# Patient Record
Sex: FEMALE | Race: ASIAN | NOT HISPANIC OR LATINO | Employment: OTHER | URBAN - METROPOLITAN AREA
[De-identification: names, ages, dates, MRNs, and addresses within clinical notes are randomized per-mention and may not be internally consistent; named-entity substitution may affect disease eponyms.]

---

## 2018-07-20 ENCOUNTER — OFFICE VISIT (OUTPATIENT)
Dept: INTERNAL MEDICINE | Facility: MEDICAL CENTER | Age: 78
End: 2018-07-20

## 2018-07-20 ENCOUNTER — HOME HEALTH ADMISSION (OUTPATIENT)
Dept: HOME HEALTH SERVICES | Facility: HOME HEALTHCARE | Age: 78
End: 2018-07-20
Payer: MEDICARE

## 2018-07-20 VITALS
BODY MASS INDEX: 22.71 KG/M2 | DIASTOLIC BLOOD PRESSURE: 66 MMHG | SYSTOLIC BLOOD PRESSURE: 111 MMHG | WEIGHT: 108.2 LBS | TEMPERATURE: 98.3 F | HEART RATE: 65 BPM | HEIGHT: 58 IN | OXYGEN SATURATION: 95 %

## 2018-07-20 DIAGNOSIS — I61.8: ICD-10-CM

## 2018-07-20 DIAGNOSIS — I10 HYPERTENSION, BENIGN: ICD-10-CM

## 2018-07-20 DIAGNOSIS — Z00.00 HEALTH MAINTENANCE EXAMINATION: ICD-10-CM

## 2018-07-20 DIAGNOSIS — I61.9 STROKE, HEMORRHAGIC (HCC): ICD-10-CM

## 2018-07-20 DIAGNOSIS — Z23 ENCOUNTER FOR IMMUNIZATION: ICD-10-CM

## 2018-07-20 PROCEDURE — 90471 IMMUNIZATION ADMIN: CPT | Performed by: INTERNAL MEDICINE

## 2018-07-20 PROCEDURE — 99204 OFFICE O/P NEW MOD 45 MIN: CPT | Mod: GC,25 | Performed by: INTERNAL MEDICINE

## 2018-07-20 PROCEDURE — 90670 PCV13 VACCINE IM: CPT | Performed by: INTERNAL MEDICINE

## 2018-07-20 RX ORDER — ATORVASTATIN CALCIUM 20 MG/1
20 TABLET, FILM COATED ORAL NIGHTLY
COMMUNITY
Start: 2018-07-21

## 2018-07-20 RX ORDER — LOSARTAN POTASSIUM 100 MG/1
100 TABLET ORAL DAILY
COMMUNITY
Start: 2018-07-21

## 2018-07-20 RX ORDER — CLONIDINE HYDROCHLORIDE 0.1 MG/1
0.1 TABLET ORAL
COMMUNITY
Start: 2018-07-21

## 2018-07-20 RX ORDER — MEMANTINE HYDROCHLORIDE 5 MG/1
5 TABLET ORAL 2 TIMES DAILY
COMMUNITY
End: 2018-07-20

## 2018-07-20 RX ORDER — AMLODIPINE BESYLATE 10 MG/1
10 TABLET ORAL DAILY
COMMUNITY
Start: 2018-07-21

## 2018-07-20 RX ORDER — DONEPEZIL HYDROCHLORIDE 5 MG/1
5 TABLET, FILM COATED ORAL NIGHTLY
COMMUNITY
End: 2018-07-20

## 2018-07-20 ASSESSMENT — ENCOUNTER SYMPTOMS: GENERAL WELL-BEING: FAIR

## 2018-07-20 ASSESSMENT — PATIENT HEALTH QUESTIONNAIRE - PHQ9: CLINICAL INTERPRETATION OF PHQ2 SCORE: 0

## 2018-07-20 ASSESSMENT — ACTIVITIES OF DAILY LIVING (ADL): BATHING_REQUIRES_ASSISTANCE: 1

## 2018-07-20 NOTE — ASSESSMENT & PLAN NOTE
- stroke in 11/2017 in [place] due to aneurysmal bleed had a craniotomy and hematoma evacuation.   - she was discharged to a rehab facility and completed a rehab in [place] then she moved to [facility] and lived with her son before moving to [place] with her other son.   - HTN on amlodipine PRN clonidine for BP>160/90 in addition to the losartan.   - Since stroke she had problems with short term memory and her PCP in [facility] started her on memantine.  - MMSE 21 today.   - On Lipitor 20 mg nightly  Plan:  - Place referral for home health evaluation.  - Ordered TSH and B12 level.  -  Follow-up in 2 weeks  - Stopped stop Donepezil and memantine.

## 2018-07-20 NOTE — ASSESSMENT & PLAN NOTE
- Colon cancer colonoscopy screening: not indicated at this age, life expectancy and has been 10 years, no colonoscopy done before.   - Breast cancer mammography screening: not indicated at this age, not done before, life expectancy is 10 years.  - Cervical cancer PAP smear screening: not indicated at this age, not done before, life expectancy less than 10 years.   - Lung cancer screening: non smoker no screening indicated  - AAA screening: Not indicated.  - Osteoporosis screening: Not recommended.   - HIV screening: Was done before and was negative.  - flu vaccine:recommended to patient.   - Tdap: Recommended to patient.  - PNA vac: had PPSV 23 in 12/2014, gave prevnar today  - Basic Labs within the last 12 months: order CBC, CMP, lipid profile, TSH and vitamin B12 level.    - Vit D: order this visit   - Shingles vac: recommended shingrix   - Gave patient a copy of medical records release form to fill  It and send it to the clinic to get copy of medical records.

## 2018-07-20 NOTE — NON-PROVIDER
"Jie Carlos is a 77 y.o. female here for a non-provider visit for:   PREVNAR (PCV13) 1 of 1    Reason for immunization: Overdue/Provider Recommended  Immunization records indicate need for vaccine: Yes, confirmed with Epic  Minimum interval has been met for this vaccine: Yes  ABN completed: Not Indicated    Order and dose verified by: ZEYAD  VIS Dated  11/05/15 was given to patient: Yes  All IAC Questionnaire questions were answered \"No.\"    Patient tolerated injection and no adverse effects were observed or reported: Yes    Pt scheduled for next dose in series: Not Indicated    "

## 2018-07-20 NOTE — PATIENT INSTRUCTIONS
-  Please the flu shot at the pharmacy   - please get the Tdap vaccine at the pharmacy.   - please get the Shingrix vaccine at the pharmacy.    Dementia  Introduction  Dementia means losing some of your brain ability. People with dementia may have problems with:  · Memory.  · Making decisions.  · Behavior.  · Speaking.  · Thinking.  · Solving problems.  Follow these instructions at home:  Medicine  · Take over-the-counter and prescription medicines only as told by your doctor.  · Avoid taking medicines that can change how you think. These include pain or sleeping medicines.  Lifestyle  · Make healthy choices:  ¨ Be active as told by your doctor.  ¨ Do not use any tobacco products, such as cigarettes, chewing tobacco, and e-cigarettes. If you need help quitting, ask your doctor.  ¨ Eat a healthy diet.  ¨ When you get stressed, do something to help yourself relax. Your doctor can give you tips.  ¨ Spend time with other people.  · Drink enough fluid to keep your pee (urine) clear or pale yellow.  · Make sure you get good sleep. Use these tips to help you get a good night's rest:  ¨ Try not to take naps during the day.  ¨ Keep your sleeping area dark and cool.  ¨ In the few hours before you go to bed, try not to do any exercise.  ¨ Try not to have foods and drinks with caffeine in the evening.  General instructions  · Talk with your doctor to figure out:  ¨ What you need help with.  ¨ What your safety needs are.  · If you were given a bracelet that tracks your location, make sure to wear it.  · Keep all follow-up visits as told by your doctor. This is important.  Contact a doctor if:  · You have any new problems.  · You have problems with choking or swallowing.  · You have any symptoms of a different sickness.  Get help right away if:  · You have a fever.  · You feel mixed up (confused) or more mixed up than before.  · You have new sleepiness.  · You have sleepiness that gets worse.  · You have a hard time staying  awake.  · You or your family members are worried for your safety.  This information is not intended to replace advice given to you by your health care provider. Make sure you discuss any questions you have with your health care provider.  Document Released: 11/30/2009 Document Revised: 05/25/2017 Document Reviewed: 09/14/2016  © 2017 [employer]vier

## 2018-07-20 NOTE — LETTER
[facility]  Yenni Lundberg M.D.  [address] [place] [address]  [place] [address]  Fax: [phone]   Authorization for Release/Disclosure of   Protected Health Information   Name: JIE SOW : 1940 SSN: [id number]   Address: [address] [place], [place] [address]o [place] [address] Phone:    [phone] (home)    I authorize the entity listed below to release/disclose the PHI below to:   [facility]/Yenni Lundberg M.D. and Yenni Lundberg M.D.   Provider or Entity Name:     Address   City, State, Zip   Phone:      Fax:     Reason for request: continuity of care   Information to be released:    [  ] LAST COLONOSCOPY,  including any PATH REPORT and follow-up  [  ] LAST FIT/COLOGUARD RESULT [  ] LAST DEXA  [  ] LAST MAMMOGRAM  [  ] LAST PAP  [  ] LAST LABS [  ] RETINA EXAM REPORT  [  ] IMMUNIZATION RECORDS  [  ] Release all info      [  ] Check here and initial the line next to each item to release ALL health information INCLUDING  _____ Care and treatment for drug and / or alcohol abuse  _____ HIV testing, infection status, or AIDS  _____ Genetic Testing    DATES OF SERVICE OR TIME PERIOD TO BE DISCLOSED: _____________  I understand and acknowledge that:  * This Authorization may be revoked at any time by you in writing, except if your health information has already been used or disclosed.  * Your health information that will be used or disclosed as a result of you signing this authorization could be re-disclosed by the recipient. If this occurs, your re-disclosed health information may no longer be protected by State or Federal laws.  * You may refuse to sign this Authorization. Your refusal will not affect your ability to obtain treatment.  * This Authorization becomes effective upon signing and will  on (date) __________.      If no date is indicated, this Authorization will  one (1) year from the signature date.    Name: Jie Sow    Signature:   Date:     2018       PLEASE  FAX REQUESTED RECORDS BACK TO: [phone]

## 2018-07-20 NOTE — PROGRESS NOTES
New Patient to Establish    Reason to establish: New patient to establish    CC: Establishing care     HPI: Jie Carlos is a 77 y.o. Female with medical history of hemorraghic stroke status post craniotomy, hypertension and insomnia presented to the clinic to establish care as a new patient.     Stroke: she has had stroke in 11/2017 in [place] while visiting her son due to aneurysmal bleed had a craniotomy and hematoma evacuation, she was discharged to a rehab facility and completed a rehab in [place] then she moved to [facility] and lived with her son before moving to [place] with her other son, currently she lives at her son`s home, she needs help her son and his wife are helping her, she has HTN, amlodipine was added to her treatment with PRN clonidine for BP>160/90 in addition to the losartan. Since then she had problems with short term memory and her PCP in [facility] started her on memantine.     HTN: diagnosed years ago, she was on losartan before the stroke, she was not compliant with her medications, after the stroke, losartan was increased to 100mg, amlodipine added with PRN clonidine for blood pressure more than 160/90. Since then she is compliant with her medication, her son and his wife helping her with medications.    Insomnia: She had difficulty falling asleep, was on sleep medication, son is not aware of medication name but after the stroke she was switched to donepezil for sleep, currently patient is sleeping well, she is not taking donepezil, last dose was more than 1 month ago.    Of note patient was started on memantine in [place] after the stroke because she had memory issues, she is on memantine 5 mg twice a day, per patient and son there is no improvement.  MMSE score of 21 today.  Depression Screening    Little interest or pleasure in doing things?  0 - not at all  Feeling down, depressed , or hopeless? 0 - not at all  Patient Health Questionnaire Score: 0     If depressive symptoms identified deferred  to follow up visit unless specifically addressed in assessment and plan.    Interpretation of PHQ-9 Total Score   Score Severity   1-4 No Depression   5-9 Mild Depression   10-14 Moderate Depression   15-19 Moderately Severe Depression   20-27 Severe Depression       Fall Risk Assessment    Has the patient had two or more falls in the last year or any fall with injury in the last year?  Yes    Safety Assessment    Throw rugs on floor.  No  Handrails on all stairs.  Yes  Good lighting in all hallways.  Yes  Difficulty hearing.  Yes  Patient counseled about all safety risks that were identified.    Functional Assessment ADLs    Are there any barriers preventing you from cooking for yourself or meeting nutritional needs?  Yes.    Are there any barriers preventing you from driving safely or obtaining transportation?  Yes.    Are there any barriers preventing you from using a telephone or calling for help?  No.    Are there any barriers preventing you from shopping?  No.    Are there any barriers preventing you from taking care of your own finances?  Yes.    Are there any barriers preventing you from managing your medications?  Yes.    Are there any barriers preventing you from showering, bathing or dressing yourself?  Yes.    Are you currently engaging in any exercise or physical activity?  No.     What is your perception of your health?  Fair.      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     • memantine (NAMENDA) 5 MG Tab Take 5 mg by mouth 2 times a day.     • amLODIPine (NORVASC)  10 MG Tab Take 10 mg by mouth every day.     • losartan (COZAAR) 100 MG Tab Take 100 mg by mouth every day.     • cloNIDine (CATAPRES) 0.1 MG Tab Take 0.1 mg by mouth every evening.     • donepezil (ARICEPT) 5 MG Tab Take 5 mg by mouth every evening.       No current facility-administered medications for this visit.        Allergies as of 07/20/2018 - Reviewed 07/20/2018   Allergen Reaction Noted   • Hctz [hydrochlorothiazide]  12/03/2014   • Lisinopril  12/03/2014       Social History     Social History   • Marital status:      Spouse name: N/A   • Number of children: N/A   • Years of education: N/A     Occupational History   • Not on file.     Social History Main Topics   • Smoking status: Never Smoker   • Smokeless tobacco: Never Used   • Alcohol use No   • Drug use: No   • Sexual activity: No     Other Topics Concern   • Not on file     Social History Narrative   • No narrative on file       Family History   Problem Relation Age of Onset   • Hypertension Father    • Hypertension Sister        Past Surgical History:   Procedure Laterality Date   • CRANIOTOMY         ROS: As per HPI. Additional pertinent symptoms as noted below.  Review of Systems   Constitutional: Negative for fever, chills, weight loss, malaise/fatigue and diaphoresis.   HENT: Negative for ear discharge, ear pain, hearing loss and tinnitus.    Eyes: Negative for blurred vision, double vision, pain, discharge and redness.   Respiratory: Negative for cough, hemoptysis, sputum production, shortness of breath and wheezing.    Cardiovascular: Negative for chest pain, palpitations, orthopnea, leg swelling and PND.   Gastrointestinal: Negative for heartburn, nausea, vomiting, abdominal pain, diarrhea, constipation and blood in stool.   Genitourinary: Negative for dysuria, urgency, frequency, hematuria and flank pain.   Musculoskeletal: Negative for myalgias, back pain, joint pain and neck pain.   Skin: Negative for itching and rash.  "  Neurological: Negative for dizziness, tingling, tremors, focal weakness, loss of consciousness and headaches.   Psychiatric/Behavioral: Negative for depression and suicidal ideas. The patient is not nervous/anxious and does not have insomnia.        /66   Pulse 65   Temp 36.8 °C (98.3 °F)   Ht 1.461 m (4' 9.5\")   Wt 49.1 kg (108 lb 3.2 oz)   SpO2 95%   BMI 23.01 kg/m²     Physical Exam  Physical Exam   Constitutional: She is well-developed, well-nourished, and in no distress. No distress.   HENT:   Head: Normocephalic and atraumatic.   Mouth/Throat: No oropharyngeal exudate.   Eyes: Pupils are equal, round, and reactive to light. No scleral icterus.   Neck: Normal range of motion.   Cardiovascular: Normal rate.  Exam reveals no gallop and no friction rub.    No murmur heard.  Pulmonary/Chest: Effort normal. No respiratory distress. She exhibits no tenderness.   Abdominal: Soft. She exhibits no distension. There is no tenderness.   Musculoskeletal: She exhibits no edema, tenderness or deformity.   Lymphadenopathy:     She has no cervical adenopathy.   Neurological: She is alert. She is not disoriented. She displays no tremor. She shows no pronator drift.   Reflex Scores:       Bicep reflexes are 2+ on the right side and 3+ on the left side.       Patellar reflexes are 1+ on the right side and 4+ on the left side.  Left sided lower face weakness.  Left upper extremity power 3/5  Lower extremities or 5 / 5     Skin: No rash noted. She is not diaphoretic. No erythema. No pallor.   Psychiatric: Mood, affect and judgment normal.         Assessment and Plan    Health maintenance examination  - Colon cancer colonoscopy screening: not indicated at this age, life expectancy and has been 10 years, no colonoscopy done before.   - Breast cancer mammography screening: not indicated at this age, not done before, life expectancy is 10 years.  - Cervical cancer PAP smear screening: not indicated at this age, not done " before, life expectancy less than 10 years.   - Lung cancer screening: non smoker no screening indicated  - AAA screening: Not indicated.  - Osteoporosis screening: Not recommended.   - HIV screening: Was done before and was negative.  - flu vaccine:recommended to patient.   - Tdap: Recommended to patient.  - PNA vac: had PPSV 23 in 12/2014, gave prevnar today  - Basic Labs within the last 12 months: order CBC, CMP, lipid profile, TSH and vitamin B12 level.    - Vit D: order this visit   - Shingles vac: recommended shingrix   - Gave patient a copy of medical records release form to fill  It and send it to the clinic to get copy of medical records.          Stroke, hemorrhagic (HCC)  - stroke in 11/2017 in [place] due to aneurysmal bleed had a craniotomy and hematoma evacuation.   - she was discharged to a rehab facility and completed a rehab in [place] then she moved to [facility] and lived with her son before moving to [place] with her other son.   - HTN on amlodipine PRN clonidine for BP>160/90 in addition to the losartan.   - Since stroke she had problems with short term memory and her PCP in [facility] started her on memantine.  - MMSE 21 today.   - On Lipitor 20 mg nightly  Plan:  - Place referral for home health evaluation.  - Ordered TSH and B12 level.  -  Follow-up in 2 weeks  - Stopped stop Donepezil and memantine.         Hypertension, benign  - diagnosed years ago.   - was on losartan before the stroke. she was not compliant with her medications.   - losartan was increased to 100mg, amlodipine added with PRN clonidine for blood pressure more than 160/90.    - her son and his wife helping her with medications.  - Continue current medication.  Plan:  - will give a patient BP log to measure BP twice daily and record it.  - will plan to stop Clonidine.                Followup: No Follow-up on file.       Signed by: Yenni Lundberg M.D.

## 2018-07-20 NOTE — LETTER
[facility]  Yenni Lundberg M.D.  [address] [place] [address] [place], [place] [address] [place] [address]  Fax: [phone]   Authorization for Release/Disclosure of   Protected Health Information   Name: KAILEE SOW : 1940 SSN: [id number]   Address: [address] [place] [address] Phone:    [phone] (home)    I authorize the entity listed below to release/disclose the PHI below to:   [facility]/Yenni Lundberg M.D. and Yenni Lundberg M.D.   Provider or Entity Name: [facility]     Address   City, [facility]   Phone:      Fax:     Reason for request: continuity of care   Information to be released:    [  ] LAST COLONOSCOPY,  including any PATH REPORT and follow-up  [  ] LAST FIT/COLOGUARD RESULT [  ] LAST DEXA  [  ] LAST MAMMOGRAM  [  ] LAST PAP  [  ] LAST LABS [  ] RETINA EXAM REPORT  [  ] IMMUNIZATION RECORDS  [  ] Release all info      [  ] Check here and initial the line next to each item to release ALL health information INCLUDING  _____ Care and treatment for drug and / or alcohol abuse  _____ HIV testing, infection status, or AIDS  _____ Genetic Testing    DATES OF SERVICE OR TIME PERIOD TO BE DISCLOSED: _____________  I understand and acknowledge that:  * This Authorization may be revoked at any time by you in writing, except if your health information has already been used or disclosed.  * Your health information that will be used or disclosed as a result of you signing this authorization could be re-disclosed by the recipient. If this occurs, your re-disclosed health information may no longer be protected by State or Federal laws.  * You may refuse to sign this Authorization. Your refusal will not affect your ability to obtain treatment.  * This Authorization becomes effective upon signing and will  on (date) __________.      If no date is indicated, this Authorization will  one (1) year from the signature date.    Name: Kailee Sow    Signature:   Date:        7/20/2018       PLEASE FAX REQUESTED RECORDS BACK TO: [phone]

## 2018-07-20 NOTE — ASSESSMENT & PLAN NOTE
- diagnosed years ago.   - was on losartan before the stroke. she was not compliant with her medications.   - losartan was increased to 100mg, amlodipine added with PRN clonidine for blood pressure more than 160/90.    - her son and his wife helping her with medications.  - Continue current medication.  Plan:  - will give a patient BP log to measure BP twice daily and record it.  - will plan to stop Clonidine.

## 2018-07-21 ENCOUNTER — HOME CARE VISIT (OUTPATIENT)
Dept: HOME HEALTH SERVICES | Facility: HOME HEALTHCARE | Age: 78
End: 2018-07-21
Payer: MEDICARE

## 2018-07-21 VITALS
HEART RATE: 75 BPM | SYSTOLIC BLOOD PRESSURE: 118 MMHG | BODY MASS INDEX: 23.3 KG/M2 | WEIGHT: 108 LBS | TEMPERATURE: 98.4 F | RESPIRATION RATE: 16 BRPM | OXYGEN SATURATION: 98 % | HEIGHT: 57 IN | DIASTOLIC BLOOD PRESSURE: 62 MMHG

## 2018-07-21 PROCEDURE — 665999 HH PPS REVENUE DEBIT

## 2018-07-21 PROCEDURE — 665998 HH PPS REVENUE CREDIT

## 2018-07-21 PROCEDURE — G0493 RN CARE EA 15 MIN HH/HOSPICE: HCPCS

## 2018-07-21 PROCEDURE — 665001 SOC-HOME HEALTH

## 2018-07-21 SDOH — ECONOMIC STABILITY: HOUSING INSECURITY: UNSAFE APPLIANCES: 0

## 2018-07-21 SDOH — ECONOMIC STABILITY: HOUSING INSECURITY: UNSAFE COOKING RANGE AREA: 0

## 2018-07-21 ASSESSMENT — ACTIVITIES OF DAILY LIVING (ADL)
HOME_HEALTH_OASIS: 01
OASIS_M1830: 03

## 2018-07-21 ASSESSMENT — ENCOUNTER SYMPTOMS
NAUSEA: DENIES
VOMITING: DENIES

## 2018-07-21 ASSESSMENT — PATIENT HEALTH QUESTIONNAIRE - PHQ9
1. LITTLE INTEREST OR PLEASURE IN DOING THINGS: 01
2. FEELING DOWN, DEPRESSED, IRRITABLE, OR HOPELESS: 00

## 2018-07-22 PROCEDURE — 665999 HH PPS REVENUE DEBIT

## 2018-07-22 PROCEDURE — 665998 HH PPS REVENUE CREDIT

## 2018-07-23 ENCOUNTER — HOME CARE VISIT (OUTPATIENT)
Dept: HOME HEALTH SERVICES | Facility: HOME HEALTHCARE | Age: 78
End: 2018-07-23
Payer: MEDICARE

## 2018-07-23 ENCOUNTER — TELEPHONE (OUTPATIENT)
Dept: HEALTH INFORMATION MANAGEMENT | Facility: OTHER | Age: 78
End: 2018-07-23

## 2018-07-23 PROCEDURE — 665999 HH PPS REVENUE DEBIT

## 2018-07-23 PROCEDURE — 665998 HH PPS REVENUE CREDIT

## 2018-07-24 ENCOUNTER — HOME CARE VISIT (OUTPATIENT)
Dept: HOME HEALTH SERVICES | Facility: HOME HEALTHCARE | Age: 78
End: 2018-07-24
Payer: MEDICARE

## 2018-07-24 VITALS
TEMPERATURE: 97.3 F | DIASTOLIC BLOOD PRESSURE: 50 MMHG | OXYGEN SATURATION: 97 % | SYSTOLIC BLOOD PRESSURE: 122 MMHG | RESPIRATION RATE: 16 BRPM | HEART RATE: 72 BPM

## 2018-07-24 VITALS
SYSTOLIC BLOOD PRESSURE: 120 MMHG | RESPIRATION RATE: 18 BRPM | HEART RATE: 71 BPM | TEMPERATURE: 97.8 F | DIASTOLIC BLOOD PRESSURE: 60 MMHG | OXYGEN SATURATION: 96 %

## 2018-07-24 VITALS
OXYGEN SATURATION: 96 % | DIASTOLIC BLOOD PRESSURE: 60 MMHG | RESPIRATION RATE: 16 BRPM | HEART RATE: 70 BPM | TEMPERATURE: 98.2 F | SYSTOLIC BLOOD PRESSURE: 120 MMHG

## 2018-07-24 PROCEDURE — G0495 RN CARE TRAIN/EDU IN HH: HCPCS

## 2018-07-24 PROCEDURE — 665998 HH PPS REVENUE CREDIT

## 2018-07-24 PROCEDURE — G0152 HHCP-SERV OF OT,EA 15 MIN: HCPCS

## 2018-07-24 PROCEDURE — 665999 HH PPS REVENUE DEBIT

## 2018-07-24 PROCEDURE — G0151 HHCP-SERV OF PT,EA 15 MIN: HCPCS

## 2018-07-24 SDOH — ECONOMIC STABILITY: HOUSING INSECURITY: UNSAFE COOKING RANGE AREA: 0

## 2018-07-24 SDOH — ECONOMIC STABILITY: HOUSING INSECURITY: UNSAFE APPLIANCES: 0

## 2018-07-24 ASSESSMENT — ACTIVITIES OF DAILY LIVING (ADL)
EATING_ASSISTANCE: 0
GROOMING_ASSISTANCE: 0
IADLS_COMMENTS: <!--EPICS-->PLEASE REFER TO OT EVALUATION.<BR><!--EPICE-->
BATHING_ASSISTIVE_EQUIPMENT_USED: SHOWER CHAIR
ORAL_CARE_ASSISTANCE: 0
LAUNDRY_ASSISTANCE: 6
DRESSING_UB_ASSISTANCE: 0
SHOPPING_ASSISTANCE: 6
TOILETING_ASSISTANCE: 0
MEAL_PREP_ASSISTANCE: 6
TRANSPORTATION_ASSISTANCE: 6
DRESSING_LB_ASSISTANCE: 0
HOUSEKEEPING_ASSISTANCE: 6
BATHING_ASSISTANCE: 4
TELEPHONE_ASSISTANCE: 6
ADLS_COMMENTS: <!--EPICS-->PLEASE REFER TO OT EVALUATION.<!--EPICE-->

## 2018-07-24 ASSESSMENT — ENCOUNTER SYMPTOMS
POOR JUDGMENT: 1
DIFFICULTY THINKING: 1

## 2018-07-25 PROCEDURE — 665999 HH PPS REVENUE DEBIT

## 2018-07-25 PROCEDURE — 665998 HH PPS REVENUE CREDIT

## 2018-07-26 ENCOUNTER — HOME CARE VISIT (OUTPATIENT)
Dept: HOME HEALTH SERVICES | Facility: HOME HEALTHCARE | Age: 78
End: 2018-07-26
Payer: MEDICARE

## 2018-07-26 VITALS
HEART RATE: 74 BPM | OXYGEN SATURATION: 98 % | TEMPERATURE: 97.2 F | SYSTOLIC BLOOD PRESSURE: 120 MMHG | RESPIRATION RATE: 16 BRPM | DIASTOLIC BLOOD PRESSURE: 60 MMHG

## 2018-07-26 PROCEDURE — 665998 HH PPS REVENUE CREDIT

## 2018-07-26 PROCEDURE — G0495 RN CARE TRAIN/EDU IN HH: HCPCS

## 2018-07-26 PROCEDURE — 665999 HH PPS REVENUE DEBIT

## 2018-07-27 ENCOUNTER — HOME CARE VISIT (OUTPATIENT)
Dept: HOME HEALTH SERVICES | Facility: HOME HEALTHCARE | Age: 78
End: 2018-07-27
Payer: MEDICARE

## 2018-07-27 PROCEDURE — 665999 HH PPS REVENUE DEBIT

## 2018-07-27 PROCEDURE — G0153 HHCP-SVS OF S/L PATH,EA 15MN: HCPCS

## 2018-07-27 PROCEDURE — 665998 HH PPS REVENUE CREDIT

## 2018-07-27 ASSESSMENT — ENCOUNTER SYMPTOMS: DEPRESSED MOOD: 1

## 2018-07-28 ENCOUNTER — HOSPITAL ENCOUNTER (OUTPATIENT)
Dept: LAB | Facility: MEDICAL CENTER | Age: 78
End: 2018-07-28
Attending: STUDENT IN AN ORGANIZED HEALTH CARE EDUCATION/TRAINING PROGRAM

## 2018-07-28 VITALS
HEART RATE: 61 BPM | RESPIRATION RATE: 16 BRPM | DIASTOLIC BLOOD PRESSURE: 60 MMHG | SYSTOLIC BLOOD PRESSURE: 98 MMHG | TEMPERATURE: 97.6 F

## 2018-07-28 DIAGNOSIS — I61.8: ICD-10-CM

## 2018-07-28 DIAGNOSIS — Z00.00 HEALTH MAINTENANCE EXAMINATION: ICD-10-CM

## 2018-07-28 DIAGNOSIS — I10 HYPERTENSION, BENIGN: ICD-10-CM

## 2018-07-28 LAB
25(OH)D3 SERPL-MCNC: 27 NG/ML (ref 30–100)
ALBUMIN SERPL BCP-MCNC: 4.4 G/DL (ref 3.2–4.9)
ALBUMIN/GLOB SERPL: 1.3 G/DL
ALP SERPL-CCNC: 92 U/L (ref 30–99)
ALT SERPL-CCNC: 23 U/L (ref 2–50)
ANION GAP SERPL CALC-SCNC: 9 MMOL/L (ref 0–11.9)
AST SERPL-CCNC: 22 U/L (ref 12–45)
BASOPHILS # BLD AUTO: 1.4 % (ref 0–1.8)
BASOPHILS # BLD: 0.11 K/UL (ref 0–0.12)
BILIRUB SERPL-MCNC: 0.5 MG/DL (ref 0.1–1.5)
BUN SERPL-MCNC: 16 MG/DL (ref 8–22)
CALCIUM SERPL-MCNC: 9.3 MG/DL (ref 8.5–10.5)
CHLORIDE SERPL-SCNC: 101 MMOL/L (ref 96–112)
CHOLEST SERPL-MCNC: 145 MG/DL (ref 100–199)
CO2 SERPL-SCNC: 27 MMOL/L (ref 20–33)
CREAT SERPL-MCNC: 1 MG/DL (ref 0.5–1.4)
EOSINOPHIL # BLD AUTO: 0.2 K/UL (ref 0–0.51)
EOSINOPHIL NFR BLD: 2.5 % (ref 0–6.9)
ERYTHROCYTE [DISTWIDTH] IN BLOOD BY AUTOMATED COUNT: 42.6 FL (ref 35.9–50)
GLOBULIN SER CALC-MCNC: 3.5 G/DL (ref 1.9–3.5)
GLUCOSE SERPL-MCNC: 94 MG/DL (ref 65–99)
HCT VFR BLD AUTO: 36.3 % (ref 37–47)
HDLC SERPL-MCNC: 73 MG/DL
HGB BLD-MCNC: 12 G/DL (ref 12–16)
IMM GRANULOCYTES # BLD AUTO: 0.02 K/UL (ref 0–0.11)
IMM GRANULOCYTES NFR BLD AUTO: 0.3 % (ref 0–0.9)
LDLC SERPL CALC-MCNC: 53 MG/DL
LYMPHOCYTES # BLD AUTO: 3.14 K/UL (ref 1–4.8)
LYMPHOCYTES NFR BLD: 39.5 % (ref 22–41)
MCH RBC QN AUTO: 30.9 PG (ref 27–33)
MCHC RBC AUTO-ENTMCNC: 33.1 G/DL (ref 33.6–35)
MCV RBC AUTO: 93.6 FL (ref 81.4–97.8)
MONOCYTES # BLD AUTO: 0.37 K/UL (ref 0–0.85)
MONOCYTES NFR BLD AUTO: 4.7 % (ref 0–13.4)
NEUTROPHILS # BLD AUTO: 4.1 K/UL (ref 2–7.15)
NEUTROPHILS NFR BLD: 51.6 % (ref 44–72)
NRBC # BLD AUTO: 0 K/UL
NRBC BLD-RTO: 0 /100 WBC
PLATELET # BLD AUTO: 260 K/UL (ref 164–446)
PMV BLD AUTO: 10.6 FL (ref 9–12.9)
POTASSIUM SERPL-SCNC: 3.6 MMOL/L (ref 3.6–5.5)
PROT SERPL-MCNC: 7.9 G/DL (ref 6–8.2)
RBC # BLD AUTO: 3.88 M/UL (ref 4.2–5.4)
SODIUM SERPL-SCNC: 137 MMOL/L (ref 135–145)
TRIGL SERPL-MCNC: 93 MG/DL (ref 0–149)
TSH SERPL DL<=0.005 MIU/L-ACNC: 1.42 UIU/ML (ref 0.38–5.33)
VIT B12 SERPL-MCNC: 605 PG/ML (ref 211–911)
WBC # BLD AUTO: 7.9 K/UL (ref 4.8–10.8)

## 2018-07-28 PROCEDURE — 80061 LIPID PANEL: CPT

## 2018-07-28 PROCEDURE — 665999 HH PPS REVENUE DEBIT

## 2018-07-28 PROCEDURE — 82607 VITAMIN B-12: CPT

## 2018-07-28 PROCEDURE — 665998 HH PPS REVENUE CREDIT

## 2018-07-28 PROCEDURE — 82306 VITAMIN D 25 HYDROXY: CPT | Mod: GA

## 2018-07-28 PROCEDURE — 85025 COMPLETE CBC W/AUTO DIFF WBC: CPT

## 2018-07-28 PROCEDURE — 84443 ASSAY THYROID STIM HORMONE: CPT

## 2018-07-28 PROCEDURE — 36415 COLL VENOUS BLD VENIPUNCTURE: CPT

## 2018-07-28 PROCEDURE — 80053 COMPREHEN METABOLIC PANEL: CPT

## 2018-07-29 PROCEDURE — 665998 HH PPS REVENUE CREDIT

## 2018-07-29 PROCEDURE — 665999 HH PPS REVENUE DEBIT

## 2018-07-30 ENCOUNTER — HOME CARE VISIT (OUTPATIENT)
Dept: HOME HEALTH SERVICES | Facility: HOME HEALTHCARE | Age: 78
End: 2018-07-30
Payer: MEDICARE

## 2018-07-30 VITALS
RESPIRATION RATE: 16 BRPM | DIASTOLIC BLOOD PRESSURE: 50 MMHG | SYSTOLIC BLOOD PRESSURE: 110 MMHG | HEART RATE: 83 BPM | OXYGEN SATURATION: 98 % | TEMPERATURE: 98.3 F

## 2018-07-30 PROCEDURE — G0495 RN CARE TRAIN/EDU IN HH: HCPCS

## 2018-07-30 PROCEDURE — 665998 HH PPS REVENUE CREDIT

## 2018-07-30 PROCEDURE — 665999 HH PPS REVENUE DEBIT

## 2018-07-30 ASSESSMENT — ENCOUNTER SYMPTOMS: POOR JUDGMENT: 1

## 2018-07-31 ENCOUNTER — HOME CARE VISIT (OUTPATIENT)
Dept: HOME HEALTH SERVICES | Facility: HOME HEALTHCARE | Age: 78
End: 2018-07-31
Payer: MEDICARE

## 2018-07-31 VITALS
SYSTOLIC BLOOD PRESSURE: 100 MMHG | OXYGEN SATURATION: 97 % | TEMPERATURE: 97.6 F | DIASTOLIC BLOOD PRESSURE: 60 MMHG | RESPIRATION RATE: 16 BRPM | HEART RATE: 71 BPM

## 2018-07-31 PROCEDURE — 665999 HH PPS REVENUE DEBIT

## 2018-07-31 PROCEDURE — 665998 HH PPS REVENUE CREDIT

## 2018-07-31 PROCEDURE — G0152 HHCP-SERV OF OT,EA 15 MIN: HCPCS

## 2018-07-31 PROCEDURE — G0151 HHCP-SERV OF PT,EA 15 MIN: HCPCS

## 2018-07-31 PROCEDURE — G0155 HHCP-SVS OF CSW,EA 15 MIN: HCPCS

## 2018-07-31 ASSESSMENT — ENCOUNTER SYMPTOMS: POOR JUDGMENT: 1

## 2018-08-01 ENCOUNTER — HOME CARE VISIT (OUTPATIENT)
Dept: HOME HEALTH SERVICES | Facility: HOME HEALTHCARE | Age: 78
End: 2018-08-01
Payer: MEDICARE

## 2018-08-01 VITALS
OXYGEN SATURATION: 96 % | SYSTOLIC BLOOD PRESSURE: 110 MMHG | HEART RATE: 72 BPM | DIASTOLIC BLOOD PRESSURE: 60 MMHG | RESPIRATION RATE: 16 BRPM | TEMPERATURE: 97.6 F

## 2018-08-01 PROCEDURE — 665999 HH PPS REVENUE DEBIT

## 2018-08-01 PROCEDURE — 665998 HH PPS REVENUE CREDIT

## 2018-08-01 ASSESSMENT — ENCOUNTER SYMPTOMS: POOR JUDGMENT: 1

## 2018-08-02 ENCOUNTER — HOME CARE VISIT (OUTPATIENT)
Dept: HOME HEALTH SERVICES | Facility: HOME HEALTHCARE | Age: 78
End: 2018-08-02
Payer: MEDICARE

## 2018-08-02 VITALS
DIASTOLIC BLOOD PRESSURE: 51 MMHG | OXYGEN SATURATION: 94 % | TEMPERATURE: 98.1 F | SYSTOLIC BLOOD PRESSURE: 122 MMHG | HEART RATE: 66 BPM | RESPIRATION RATE: 16 BRPM

## 2018-08-02 PROCEDURE — 665999 HH PPS REVENUE DEBIT

## 2018-08-02 PROCEDURE — G0180 MD CERTIFICATION HHA PATIENT: HCPCS | Performed by: INTERNAL MEDICINE

## 2018-08-02 PROCEDURE — 665998 HH PPS REVENUE CREDIT

## 2018-08-02 PROCEDURE — G0152 HHCP-SERV OF OT,EA 15 MIN: HCPCS

## 2018-08-02 ASSESSMENT — ENCOUNTER SYMPTOMS: POOR JUDGMENT: 1

## 2018-08-03 PROCEDURE — 665998 HH PPS REVENUE CREDIT

## 2018-08-03 PROCEDURE — 665999 HH PPS REVENUE DEBIT

## 2018-08-04 ENCOUNTER — HOME CARE VISIT (OUTPATIENT)
Dept: HOME HEALTH SERVICES | Facility: HOME HEALTHCARE | Age: 78
End: 2018-08-04
Payer: MEDICARE

## 2018-08-04 PROCEDURE — 665999 HH PPS REVENUE DEBIT

## 2018-08-04 PROCEDURE — G0299 HHS/HOSPICE OF RN EA 15 MIN: HCPCS

## 2018-08-04 PROCEDURE — 665998 HH PPS REVENUE CREDIT

## 2018-08-05 VITALS
OXYGEN SATURATION: 96 % | TEMPERATURE: 96.8 F | SYSTOLIC BLOOD PRESSURE: 121 MMHG | HEART RATE: 62 BPM | DIASTOLIC BLOOD PRESSURE: 52 MMHG | RESPIRATION RATE: 18 BRPM

## 2018-08-05 PROCEDURE — 665998 HH PPS REVENUE CREDIT

## 2018-08-05 PROCEDURE — 665999 HH PPS REVENUE DEBIT

## 2018-08-06 ENCOUNTER — OFFICE VISIT (OUTPATIENT)
Dept: INTERNAL MEDICINE | Facility: MEDICAL CENTER | Age: 78
End: 2018-08-06

## 2018-08-06 VITALS
TEMPERATURE: 98.6 F | SYSTOLIC BLOOD PRESSURE: 125 MMHG | DIASTOLIC BLOOD PRESSURE: 66 MMHG | HEIGHT: 58 IN | BODY MASS INDEX: 22.67 KG/M2 | WEIGHT: 108 LBS | HEART RATE: 68 BPM | OXYGEN SATURATION: 96 %

## 2018-08-06 DIAGNOSIS — H91.90 DECREASED HEARING, UNSPECIFIED LATERALITY: ICD-10-CM

## 2018-08-06 DIAGNOSIS — I10 HYPERTENSION, BENIGN: ICD-10-CM

## 2018-08-06 DIAGNOSIS — R20.2 NUMBNESS AND TINGLING: ICD-10-CM

## 2018-08-06 DIAGNOSIS — R20.0 NUMBNESS AND TINGLING: ICD-10-CM

## 2018-08-06 DIAGNOSIS — I61.9 STROKE, HEMORRHAGIC (HCC): ICD-10-CM

## 2018-08-06 PROCEDURE — 99214 OFFICE O/P EST MOD 30 MIN: CPT | Mod: GC | Performed by: INTERNAL MEDICINE

## 2018-08-06 PROCEDURE — 665999 HH PPS REVENUE DEBIT

## 2018-08-06 PROCEDURE — 665998 HH PPS REVENUE CREDIT

## 2018-08-06 SDOH — ECONOMIC STABILITY: HOUSING INSECURITY: UNSAFE COOKING RANGE AREA: 0

## 2018-08-06 SDOH — ECONOMIC STABILITY: HOUSING INSECURITY: UNSAFE APPLIANCES: 0

## 2018-08-06 ASSESSMENT — PAIN SCALES - GENERAL: PAINLEVEL: NO PAIN

## 2018-08-06 NOTE — PROGRESS NOTES
Established Patient    Jie presents today with the following:    CC: Follow-up stroke, hypertension and new complain of feet numbness.    HPI: Jie Carlos is a 77 y.o. female with medical history of hemorrhagic stroke status post craniectomy and hypertension presented to the clinic for follow-up.    Stroke: she had stroke in 11/2017 in [place] due to aneurysmal bleed, had a craniotomy and hematoma evacuation, she completed a rehab, currently she is living at her son`s home, her son and his wife are helping her, Since then she had problems with short term memory and her PCP in [facility] started her on memantine.      HTN: diagnosed years ago, she was on losartan before the stroke, she was not compliant with her medications, after the stroke, losartan was increased to 100mg, amlodipine added with PRN clonidine for blood pressure more than 160/90. Since then she is compliant with her medication, her son and his wife helping her with medications, her average blood pressure readings at home 100s to 110s over 70s. She did not need to use the clonidine since she moved to [place].    Decreased hearing: Since the stroke her son noticed she is having decrease in her hearing, sometimes he needs to speak louder than usual, patient denies any symptoms.    Bilateral feet numbness: Patient complaining of feeling numbness and tingling in her feet, decreases when she starts walking, numbness started after the stroke and associated with feeling her head is heavy, vitamin B12 was 605 in July 2018.      Patient Active Problem List    Diagnosis Date Noted   • Numbness and tingling 08/06/2018   • Decreased hearing 08/06/2018   • Health maintenance examination 07/20/2018   • Stroke, hemorrhagic (HCC) 11/29/2017   • Hypertension, benign 12/02/2014   • Cough due to ACE inhibitor 12/02/2014   • Weakness 12/02/2014   • Hyponatremia 12/01/2014       Current Outpatient Prescriptions   Medication Sig Dispense Refill   • memantine (NAMENDA) 5 MG  Tab Take 5 mg by mouth 2 times a day.     • atorvastatin (LIPITOR) 20 MG Tab Take 20 mg by mouth every evening.     • amLODIPine (NORVASC) 10 MG Tab Take 10 mg by mouth every day.     • losartan (COZAAR) 100 MG Tab Take 100 mg by mouth every day.     • cloNIDine (CATAPRES) 0.1 MG Tab Take 0.1 mg by mouth 1 time daily as needed (high blood pressure). take if blood pressure is greater than 160/90     • melatonin 3 MG Tab Take 3 mg by mouth 1 time daily as needed (sleep).       No current facility-administered medications for this visit.        Social History     Social History   • Marital status:      Spouse name: N/A   • Number of children: N/A   • Years of education: N/A     Occupational History   • Not on file.     Social History Main Topics   • Smoking status: Never Smoker   • Smokeless tobacco: Never Used   • Alcohol use No   • Drug use: No   • Sexual activity: No     Other Topics Concern   • Not on file     Social History Narrative   • No narrative on file       Family History   Problem Relation Age of Onset   • Hypertension Father    • Hypertension Sister        ROS: As per HPI. Additional pertinent symptoms as noted below.  Review of Systems   Constitutional: Negative for fever, chills, weight loss, malaise/fatigue and diaphoresis.   HENT: Negative for ear discharge, ear pain, hearing loss and tinnitus.    Eyes: Negative for blurred vision, double vision, pain, discharge and redness.   Respiratory: Negative for cough, hemoptysis, sputum production, shortness of breath and wheezing.    Cardiovascular: Negative for chest pain, palpitations, orthopnea, leg swelling and PND.   Gastrointestinal: Negative for heartburn, nausea, vomiting, abdominal pain, diarrhea, constipation and blood in stool.   Genitourinary: Negative for dysuria, urgency, frequency, hematuria and flank pain.   Musculoskeletal: Negative for myalgias, back pain, joint pain and neck pain.   Skin: Negative for itching and rash.  "  Neurological: Negative for dizziness, tingling, tremors, focal weakness, loss of consciousness and headaches.   Psychiatric/Behavioral: Negative for depression and suicidal ideas. The patient is not nervous/anxious and does not have insomnia        /66   Pulse 68   Temp 37 °C (98.6 °F)   Ht 1.461 m (4' 9.5\")   Wt 49 kg (108 lb)   SpO2 96%   Breastfeeding? No   BMI 22.97 kg/m²     Physical Exam  Constitutional: She is well-developed, well-nourished, and in no distress. No distress.   HENT:   Ears: within normal, Abbasi test normal.   Head: Normocephalic and atraumatic.   Mouth/Throat: No oropharyngeal exudate.   Eyes: Pupils are equal, round, and reactive to light. No scleral icterus.   Neck: Normal range of motion.   Cardiovascular: Normal rate.  Exam reveals no gallop and no friction rub.    No murmur heard.  Pulmonary/Chest: Effort normal. No respiratory distress. She exhibits no tenderness.   Abdominal: Soft. She exhibits no distension. There is no tenderness.   Musculoskeletal: She exhibits no edema, tenderness or deformity.   Lymphadenopathy:     She has no cervical adenopathy.   Neurological: She is alert. She is not disoriented. She displays no tremor. She shows no pronator drift.   Reflex Scores:       Bicep reflexes are 2+ on the right side and 3+ on the left side.       Patellar reflexes are 1+ on the right side and 4+ on the left side.  Left sided lower face weakness.  Left upper extremity power 3/5  Lower extremities or 5 / 5     Skin: No rash noted. She is not diaphoretic. No erythema. No pallor.   Psychiatric: Mood, affect and judgment normal.         Assessment and Plan    Hypertension, benign  - diagnosed in 2017.    - was on losartan before the stroke. she was not compliant with her medications.   - Currently on losartan 100mg daily, amlodipine 10 mg daily with PRN clonidine for blood pressure more than 160/90.    - her son and his wife helping her with medications.  - Average of blood " pressure at home 110s /70s.  Plan:  - Continue current medication.     Stroke, hemorrhagic (HCC)  - stroke in 11/2017 in [place] due to aneurysmal bleed had a craniotomy and hematoma evacuation.   - she completed a rehab in [place].    - HTN on amlodipine and losartan with PRN clonidine for BP>160/90 in addition to the losartan.   - On Lipitor 20 mg nightly.  - Since stroke she had problems with short term memory and her PCP in [facility] started her on memantine.  - MMSE 21 in July 2018.   - Currently following up with home health services.  - TSH and B12 within normal in July 2018.  Plan:  - Continue hypertension and dyslipidemia medications  - Continue following up with home health.  - Had a discussion with the patient and her son for stopping memantine as there is no evidence that it helps for vascular dementia, patient and son still want to use it.      Decreased hearing  - noticed  eugene the stroke.   - son noticed she is having decrease in her hearing, sometimes he needs to speak louder than usual.   - denies any symptoms.  - On examination within normal, Abbasi test show no lateralization.   - Referral to ENT for evaluation of possible need for hearing aids.    Numbness and tingling  - complaining of feeling numbness and tingling in her feet.   - decreases when walking.   - numbness started after the stroke and associated with feeling her head is heavy.   - vitamin B12 was 605 and fasting blood glucose is within normal in July 2018.  - No history of alcoholism.  - Most likely related to stroke or aging, reversible causes were ruled out.  - We'll hold off trying treatment with gabapentin because of her risk of falls, patient alone by herself in the morning unsupervised, the severity of numbness is mild and not affecting her lifestyle. Discussed with the patient and son.        Signed by: Yenni Lundberg M.D.

## 2018-08-06 NOTE — ASSESSMENT & PLAN NOTE
- noticed  eugene the stroke.   - son noticed she is having decrease in her hearing, sometimes he needs to speak louder than usual.   - denies any symptoms.  - On examination within normal, Abbasi test show no lateralization.   - Referral to ENT for evaluation of possible need for hearing aids.

## 2018-08-06 NOTE — PATIENT INSTRUCTIONS
- please follow up referral     Hearing Loss  Introduction  Hearing loss is a partial or total loss of the ability to hear. This can be temporary or permanent, and it can happen in one or both ears. Hearing loss may be referred to as deafness.  Medical care is necessary to treat hearing loss properly and to prevent the condition from getting worse. Your hearing may partially or completely come back, depending on what caused your hearing loss and how severe it is. In some cases, hearing loss is permanent.  What are the causes?  Common causes of hearing loss include:  · Too much wax in the ear canal.  · Infection of the ear canal or middle ear.  · Fluid in the middle ear.  · Injury to the ear or surrounding area.  · An object stuck in the ear.  · Prolonged exposure to loud sounds, such as music.  Less common causes of hearing loss include:  · Tumors in the ear.  · Viral or bacterial infections, such as meningitis.  · A hole in the eardrum (perforated eardrum).  · Problems with the hearing nerve that sends signals between the brain and the ear.  · Certain medicines.  What are the signs or symptoms?  Symptoms of this condition may include:  · Difficulty telling the difference between sounds.  · Difficulty following a conversation when there is background noise.  · Lack of response to sounds in your environment. This may be most noticeable when you do not respond to startling sounds.  · Needing to turn up the volume on the television, radio, etc.  · Ringing in the ears.  · Dizziness.  · Pain in the ears.  How is this diagnosed?  This condition is diagnosed based on a physical exam and a hearing test (audiometry). The audiometry test will be performed by a hearing specialist (audiologist). You may also be referred to an ear, nose, and throat (ENT) specialist (otolaryngologist).  How is this treated?  Treatment for recent onset of hearing loss may include:  · Ear wax removal.  · Being prescribed medicines to prevent  infection (antibiotics).  · Being prescribed medicines to reduce inflammation (corticosteroids).  Follow these instructions at home:  · If you were prescribed an antibiotic medicine, take it as told by your health care provider. Do not stop taking the antibiotic even if you start to feel better.  · Take over-the-counter and prescription medicines only as told by your health care provider.  · Avoid loud noises.  · Return to your normal activities as told by your health care provider. Ask your health care provider what activities are safe for you.  · Keep all follow-up visits as told by your health care provider. This is important.  Contact a health care provider if:  · You feel dizzy.  · You develop new symptoms.  · You vomit or feel nauseous.  · You have a fever.  Get help right away if:  · You develop sudden changes in your vision.  · You have severe ear pain.  · You have new or increased weakness.  · You have a severe headache.  This information is not intended to replace advice given to you by your health care provider. Make sure you discuss any questions you have with your health care provider.  Document Released: 12/18/2006 Document Revised: 05/25/2017 Document Reviewed: 05/04/2016  © 2017 [employer]

## 2018-08-06 NOTE — ASSESSMENT & PLAN NOTE
- stroke in 11/2017 in [place] due to aneurysmal bleed had a craniotomy and hematoma evacuation.   - she completed a rehab in [place].    - HTN on amlodipine and losartan with PRN clonidine for BP>160/90 in addition to the losartan.   - On Lipitor 20 mg nightly.  - Since stroke she had problems with short term memory and her PCP in [facility] started her on memantine.  - MMSE 21 in July 2018.   - Currently following up with home health services.  - TSH and B12 within normal in July 2018.  Plan:  - Continue hypertension and dyslipidemia medications  - Continue following up with home health.  - Had a discussion with the patient and her son for stopping memantine as there is no evidence that it helps for vascular dementia, patient and son still want to use it.

## 2018-08-06 NOTE — ASSESSMENT & PLAN NOTE
- complaining of feeling numbness and tingling in her feet.   - decreases when walking.   - numbness started after the stroke and associated with feeling her head is heavy.   - vitamin B12 was 605 and fasting blood glucose is within normal in July 2018.  - No history of alcoholism.  - Most likely related to stroke or aging, reversible causes were ruled out.  - We'll hold off trying treatment with gabapentin because of her risk of falls, patient alone by herself in the morning unsupervised, the severity of numbness is mild and not affecting her lifestyle. Discussed with the patient and son.

## 2018-08-06 NOTE — ASSESSMENT & PLAN NOTE
- diagnosed in 2017.    - was on losartan before the stroke. she was not compliant with her medications.   - Currently on losartan 100mg daily, amlodipine 10 mg daily with PRN clonidine for blood pressure more than 160/90.    - her son and his wife helping her with medications.  - Average of blood pressure at home 110s /70s.  Plan:  - Continue current medication.

## 2018-08-07 ENCOUNTER — HOME CARE VISIT (OUTPATIENT)
Dept: HOME HEALTH SERVICES | Facility: HOME HEALTHCARE | Age: 78
End: 2018-08-07
Payer: MEDICARE

## 2018-08-07 PROCEDURE — 665999 HH PPS REVENUE DEBIT

## 2018-08-07 PROCEDURE — G0495 RN CARE TRAIN/EDU IN HH: HCPCS

## 2018-08-07 PROCEDURE — 665998 HH PPS REVENUE CREDIT

## 2018-08-08 ENCOUNTER — HOME CARE VISIT (OUTPATIENT)
Dept: HOME HEALTH SERVICES | Facility: HOME HEALTHCARE | Age: 78
End: 2018-08-08
Payer: MEDICARE

## 2018-08-08 VITALS
DIASTOLIC BLOOD PRESSURE: 50 MMHG | HEART RATE: 59 BPM | SYSTOLIC BLOOD PRESSURE: 110 MMHG | RESPIRATION RATE: 18 BRPM | TEMPERATURE: 97.2 F

## 2018-08-08 VITALS
TEMPERATURE: 98.3 F | DIASTOLIC BLOOD PRESSURE: 60 MMHG | SYSTOLIC BLOOD PRESSURE: 110 MMHG | HEART RATE: 67 BPM | OXYGEN SATURATION: 96 % | RESPIRATION RATE: 18 BRPM

## 2018-08-08 VITALS
DIASTOLIC BLOOD PRESSURE: 80 MMHG | HEART RATE: 68 BPM | RESPIRATION RATE: 18 BRPM | SYSTOLIC BLOOD PRESSURE: 120 MMHG | TEMPERATURE: 97.4 F | OXYGEN SATURATION: 96 %

## 2018-08-08 PROCEDURE — 665998 HH PPS REVENUE CREDIT

## 2018-08-08 PROCEDURE — G0153 HHCP-SVS OF S/L PATH,EA 15MN: HCPCS

## 2018-08-08 PROCEDURE — 665999 HH PPS REVENUE DEBIT

## 2018-08-08 PROCEDURE — G0152 HHCP-SERV OF OT,EA 15 MIN: HCPCS

## 2018-08-08 PROCEDURE — G0151 HHCP-SERV OF PT,EA 15 MIN: HCPCS

## 2018-08-08 SDOH — ECONOMIC STABILITY: HOUSING INSECURITY: UNSAFE APPLIANCES: 0

## 2018-08-08 SDOH — ECONOMIC STABILITY: HOUSING INSECURITY: UNSAFE COOKING RANGE AREA: 0

## 2018-08-08 SDOH — ECONOMIC STABILITY: HOUSING INSECURITY: HOME SAFETY: RECOMMENDATIONS GIVEN DURING THIS VISIT.

## 2018-08-08 ASSESSMENT — ENCOUNTER SYMPTOMS
POOR JUDGMENT: 1
DIFFICULTY THINKING: 1
RESPIRATORY SYMPTOMS COMMENTS: DENIES BREATHING PROBLEM

## 2018-08-09 ENCOUNTER — HOME CARE VISIT (OUTPATIENT)
Dept: HOME HEALTH SERVICES | Facility: HOME HEALTHCARE | Age: 78
End: 2018-08-09
Payer: MEDICARE

## 2018-08-09 VITALS
TEMPERATURE: 98.3 F | HEART RATE: 69 BPM | RESPIRATION RATE: 16 BRPM | DIASTOLIC BLOOD PRESSURE: 50 MMHG | OXYGEN SATURATION: 98 % | SYSTOLIC BLOOD PRESSURE: 117 MMHG

## 2018-08-09 VITALS
HEART RATE: 65 BPM | DIASTOLIC BLOOD PRESSURE: 50 MMHG | TEMPERATURE: 97.4 F | SYSTOLIC BLOOD PRESSURE: 118 MMHG | OXYGEN SATURATION: 93 % | RESPIRATION RATE: 18 BRPM

## 2018-08-09 PROCEDURE — 665998 HH PPS REVENUE CREDIT

## 2018-08-09 PROCEDURE — 665999 HH PPS REVENUE DEBIT

## 2018-08-09 PROCEDURE — G0152 HHCP-SERV OF OT,EA 15 MIN: HCPCS

## 2018-08-09 ASSESSMENT — ENCOUNTER SYMPTOMS
POOR JUDGMENT: 1
POOR JUDGMENT: 1

## 2018-08-10 ENCOUNTER — TELEPHONE (OUTPATIENT)
Dept: INTERNAL MEDICINE | Facility: MEDICAL CENTER | Age: 78
End: 2018-08-10

## 2018-08-10 ENCOUNTER — HOME CARE VISIT (OUTPATIENT)
Dept: HOME HEALTH SERVICES | Facility: HOME HEALTHCARE | Age: 78
End: 2018-08-10
Payer: MEDICARE

## 2018-08-10 PROCEDURE — G0151 HHCP-SERV OF PT,EA 15 MIN: HCPCS

## 2018-08-10 PROCEDURE — 665998 HH PPS REVENUE CREDIT

## 2018-08-10 PROCEDURE — 665999 HH PPS REVENUE DEBIT

## 2018-08-11 PROCEDURE — 665999 HH PPS REVENUE DEBIT

## 2018-08-11 PROCEDURE — 665998 HH PPS REVENUE CREDIT

## 2018-08-12 PROCEDURE — 665998 HH PPS REVENUE CREDIT

## 2018-08-12 PROCEDURE — 665999 HH PPS REVENUE DEBIT

## 2018-08-13 ENCOUNTER — HOME CARE VISIT (OUTPATIENT)
Dept: HOME HEALTH SERVICES | Facility: HOME HEALTHCARE | Age: 78
End: 2018-08-13
Payer: MEDICARE

## 2018-08-13 VITALS
RESPIRATION RATE: 16 BRPM | HEART RATE: 92 BPM | OXYGEN SATURATION: 92 % | DIASTOLIC BLOOD PRESSURE: 50 MMHG | TEMPERATURE: 97.8 F | SYSTOLIC BLOOD PRESSURE: 96 MMHG

## 2018-08-13 VITALS
HEART RATE: 64 BPM | TEMPERATURE: 98.2 F | OXYGEN SATURATION: 96 % | DIASTOLIC BLOOD PRESSURE: 58 MMHG | RESPIRATION RATE: 18 BRPM | SYSTOLIC BLOOD PRESSURE: 120 MMHG

## 2018-08-13 PROCEDURE — 665998 HH PPS REVENUE CREDIT

## 2018-08-13 PROCEDURE — 665999 HH PPS REVENUE DEBIT

## 2018-08-13 PROCEDURE — G0493 RN CARE EA 15 MIN HH/HOSPICE: HCPCS

## 2018-08-13 ASSESSMENT — ENCOUNTER SYMPTOMS
POOR JUDGMENT: 1
POOR JUDGMENT: 1

## 2018-08-14 ENCOUNTER — HOME CARE VISIT (OUTPATIENT)
Dept: HOME HEALTH SERVICES | Facility: HOME HEALTHCARE | Age: 78
End: 2018-08-14
Payer: MEDICARE

## 2018-08-14 VITALS
SYSTOLIC BLOOD PRESSURE: 118 MMHG | OXYGEN SATURATION: 95 % | HEART RATE: 72 BPM | DIASTOLIC BLOOD PRESSURE: 50 MMHG | TEMPERATURE: 98.5 F | RESPIRATION RATE: 16 BRPM

## 2018-08-14 PROCEDURE — G0151 HHCP-SERV OF PT,EA 15 MIN: HCPCS

## 2018-08-14 PROCEDURE — 665998 HH PPS REVENUE CREDIT

## 2018-08-14 PROCEDURE — 665999 HH PPS REVENUE DEBIT

## 2018-08-14 PROCEDURE — G0152 HHCP-SERV OF OT,EA 15 MIN: HCPCS

## 2018-08-14 ASSESSMENT — ENCOUNTER SYMPTOMS: POOR JUDGMENT: 1

## 2018-08-15 ENCOUNTER — HOME CARE VISIT (OUTPATIENT)
Dept: HOME HEALTH SERVICES | Facility: HOME HEALTHCARE | Age: 78
End: 2018-08-15
Payer: MEDICARE

## 2018-08-15 VITALS
DIASTOLIC BLOOD PRESSURE: 50 MMHG | TEMPERATURE: 98.5 F | RESPIRATION RATE: 16 BRPM | OXYGEN SATURATION: 95 % | SYSTOLIC BLOOD PRESSURE: 118 MMHG | HEART RATE: 72 BPM

## 2018-08-15 VITALS
TEMPERATURE: 98 F | RESPIRATION RATE: 16 BRPM | HEART RATE: 72 BPM | OXYGEN SATURATION: 97 % | SYSTOLIC BLOOD PRESSURE: 118 MMHG | DIASTOLIC BLOOD PRESSURE: 58 MMHG

## 2018-08-15 PROCEDURE — G0152 HHCP-SERV OF OT,EA 15 MIN: HCPCS

## 2018-08-15 PROCEDURE — 665998 HH PPS REVENUE CREDIT

## 2018-08-15 PROCEDURE — 665999 HH PPS REVENUE DEBIT

## 2018-08-15 ASSESSMENT — ENCOUNTER SYMPTOMS: POOR JUDGMENT: 1

## 2018-08-16 PROCEDURE — 665998 HH PPS REVENUE CREDIT

## 2018-08-16 PROCEDURE — 665999 HH PPS REVENUE DEBIT

## 2018-08-17 ENCOUNTER — HOME CARE VISIT (OUTPATIENT)
Dept: HOME HEALTH SERVICES | Facility: HOME HEALTHCARE | Age: 78
End: 2018-08-17
Payer: MEDICARE

## 2018-08-17 PROCEDURE — 665998 HH PPS REVENUE CREDIT

## 2018-08-17 PROCEDURE — G0151 HHCP-SERV OF PT,EA 15 MIN: HCPCS

## 2018-08-17 PROCEDURE — 665999 HH PPS REVENUE DEBIT

## 2018-08-18 PROCEDURE — 665998 HH PPS REVENUE CREDIT

## 2018-08-18 PROCEDURE — 665999 HH PPS REVENUE DEBIT

## 2018-08-19 PROCEDURE — 665998 HH PPS REVENUE CREDIT

## 2018-08-19 PROCEDURE — 665999 HH PPS REVENUE DEBIT

## 2018-08-19 ASSESSMENT — ENCOUNTER SYMPTOMS: POOR JUDGMENT: 1

## 2018-08-20 VITALS
SYSTOLIC BLOOD PRESSURE: 125 MMHG | TEMPERATURE: 97.8 F | OXYGEN SATURATION: 96 % | DIASTOLIC BLOOD PRESSURE: 58 MMHG | RESPIRATION RATE: 16 BRPM | HEART RATE: 73 BPM

## 2018-08-20 PROCEDURE — 665998 HH PPS REVENUE CREDIT

## 2018-08-20 PROCEDURE — 665999 HH PPS REVENUE DEBIT

## 2018-08-21 ENCOUNTER — HOME CARE VISIT (OUTPATIENT)
Dept: HOME HEALTH SERVICES | Facility: HOME HEALTHCARE | Age: 78
End: 2018-08-21
Payer: MEDICARE

## 2018-08-21 VITALS
OXYGEN SATURATION: 97 % | RESPIRATION RATE: 16 BRPM | HEART RATE: 63 BPM | DIASTOLIC BLOOD PRESSURE: 52 MMHG | TEMPERATURE: 98.4 F | SYSTOLIC BLOOD PRESSURE: 122 MMHG

## 2018-08-21 PROCEDURE — G0151 HHCP-SERV OF PT,EA 15 MIN: HCPCS

## 2018-08-21 PROCEDURE — 665999 HH PPS REVENUE DEBIT

## 2018-08-21 PROCEDURE — G0152 HHCP-SERV OF OT,EA 15 MIN: HCPCS

## 2018-08-21 PROCEDURE — 665998 HH PPS REVENUE CREDIT

## 2018-08-21 ASSESSMENT — ENCOUNTER SYMPTOMS: POOR JUDGMENT: 1

## 2018-08-22 VITALS
HEART RATE: 72 BPM | RESPIRATION RATE: 16 BRPM | OXYGEN SATURATION: 96 % | SYSTOLIC BLOOD PRESSURE: 130 MMHG | DIASTOLIC BLOOD PRESSURE: 55 MMHG | TEMPERATURE: 97.5 F

## 2018-08-22 PROCEDURE — 665998 HH PPS REVENUE CREDIT

## 2018-08-22 PROCEDURE — 665999 HH PPS REVENUE DEBIT

## 2018-08-22 ASSESSMENT — ENCOUNTER SYMPTOMS: POOR JUDGMENT: 1

## 2018-08-23 ENCOUNTER — HOME CARE VISIT (OUTPATIENT)
Dept: HOME HEALTH SERVICES | Facility: HOME HEALTHCARE | Age: 78
End: 2018-08-23
Payer: MEDICARE

## 2018-08-23 VITALS
SYSTOLIC BLOOD PRESSURE: 130 MMHG | DIASTOLIC BLOOD PRESSURE: 58 MMHG | OXYGEN SATURATION: 95 % | RESPIRATION RATE: 16 BRPM | HEART RATE: 71 BPM | TEMPERATURE: 98 F

## 2018-08-23 PROCEDURE — 665999 HH PPS REVENUE DEBIT

## 2018-08-23 PROCEDURE — G0152 HHCP-SERV OF OT,EA 15 MIN: HCPCS

## 2018-08-23 PROCEDURE — 665998 HH PPS REVENUE CREDIT

## 2018-08-23 ASSESSMENT — ENCOUNTER SYMPTOMS: POOR JUDGMENT: 1

## 2018-08-24 ENCOUNTER — HOME CARE VISIT (OUTPATIENT)
Dept: HOME HEALTH SERVICES | Facility: HOME HEALTHCARE | Age: 78
End: 2018-08-24
Payer: MEDICARE

## 2018-08-24 PROCEDURE — G0151 HHCP-SERV OF PT,EA 15 MIN: HCPCS

## 2018-08-24 PROCEDURE — 665999 HH PPS REVENUE DEBIT

## 2018-08-24 PROCEDURE — 665998 HH PPS REVENUE CREDIT

## 2018-08-25 PROCEDURE — 665998 HH PPS REVENUE CREDIT

## 2018-08-25 PROCEDURE — 665999 HH PPS REVENUE DEBIT

## 2018-08-26 VITALS
SYSTOLIC BLOOD PRESSURE: 125 MMHG | OXYGEN SATURATION: 95 % | DIASTOLIC BLOOD PRESSURE: 55 MMHG | RESPIRATION RATE: 18 BRPM | HEART RATE: 72 BPM | TEMPERATURE: 97.6 F

## 2018-08-26 PROCEDURE — 665999 HH PPS REVENUE DEBIT

## 2018-08-26 PROCEDURE — 665998 HH PPS REVENUE CREDIT

## 2018-08-26 SDOH — ECONOMIC STABILITY: HOUSING INSECURITY: UNSAFE COOKING RANGE AREA: 0

## 2018-08-26 SDOH — ECONOMIC STABILITY: HOUSING INSECURITY: HOME SAFETY: INSTRUCTED ON SAFETY WITH TRIP HAZARDS.

## 2018-08-26 SDOH — ECONOMIC STABILITY: HOUSING INSECURITY: UNSAFE APPLIANCES: 0

## 2018-08-26 ASSESSMENT — ENCOUNTER SYMPTOMS: POOR JUDGMENT: 1

## 2018-08-27 ENCOUNTER — HOME CARE VISIT (OUTPATIENT)
Dept: HOME HEALTH SERVICES | Facility: HOME HEALTHCARE | Age: 78
End: 2018-08-27
Payer: MEDICARE

## 2018-08-27 VITALS
SYSTOLIC BLOOD PRESSURE: 118 MMHG | DIASTOLIC BLOOD PRESSURE: 50 MMHG | RESPIRATION RATE: 16 BRPM | HEART RATE: 64 BPM | TEMPERATURE: 99 F | OXYGEN SATURATION: 97 %

## 2018-08-27 PROCEDURE — 665999 HH PPS REVENUE DEBIT

## 2018-08-27 PROCEDURE — G0152 HHCP-SERV OF OT,EA 15 MIN: HCPCS

## 2018-08-27 PROCEDURE — 665998 HH PPS REVENUE CREDIT

## 2018-08-27 ASSESSMENT — ENCOUNTER SYMPTOMS: POOR JUDGMENT: 1

## 2018-08-28 PROCEDURE — 665999 HH PPS REVENUE DEBIT

## 2018-08-28 PROCEDURE — 665998 HH PPS REVENUE CREDIT

## 2018-08-29 PROCEDURE — 665999 HH PPS REVENUE DEBIT

## 2018-08-29 PROCEDURE — 665998 HH PPS REVENUE CREDIT

## 2018-08-30 ENCOUNTER — HOME CARE VISIT (OUTPATIENT)
Dept: HOME HEALTH SERVICES | Facility: HOME HEALTHCARE | Age: 78
End: 2018-08-30
Payer: MEDICARE

## 2018-08-30 VITALS
RESPIRATION RATE: 16 BRPM | OXYGEN SATURATION: 95 % | DIASTOLIC BLOOD PRESSURE: 52 MMHG | TEMPERATURE: 98.8 F | HEART RATE: 65 BPM | SYSTOLIC BLOOD PRESSURE: 120 MMHG

## 2018-08-30 PROCEDURE — 665999 HH PPS REVENUE DEBIT

## 2018-08-30 PROCEDURE — G0152 HHCP-SERV OF OT,EA 15 MIN: HCPCS

## 2018-08-30 PROCEDURE — 665997 HH PPS REVENUE ADJ

## 2018-08-30 PROCEDURE — 665998 HH PPS REVENUE CREDIT

## 2018-08-30 SDOH — ECONOMIC STABILITY: HOUSING INSECURITY: HOME SAFETY: CONT TO USE RAILS FOR ASCENDING AND DESCENDING

## 2018-08-30 ASSESSMENT — ENCOUNTER SYMPTOMS: POOR JUDGMENT: 1

## 2018-08-30 ASSESSMENT — ACTIVITIES OF DAILY LIVING (ADL)
HOME_HEALTH_OASIS: 01
OASIS_M1830: 03

## 2018-08-31 PROCEDURE — 665998 HH PPS REVENUE CREDIT

## 2018-08-31 PROCEDURE — 665999 HH PPS REVENUE DEBIT

## 2018-09-01 PROCEDURE — 665998 HH PPS REVENUE CREDIT

## 2018-09-01 PROCEDURE — 665999 HH PPS REVENUE DEBIT

## 2018-09-02 PROCEDURE — 665998 HH PPS REVENUE CREDIT

## 2018-09-02 PROCEDURE — 665999 HH PPS REVENUE DEBIT

## 2018-09-03 PROCEDURE — 665999 HH PPS REVENUE DEBIT

## 2018-09-03 PROCEDURE — 665998 HH PPS REVENUE CREDIT

## 2018-09-04 PROCEDURE — 665998 HH PPS REVENUE CREDIT

## 2018-09-04 PROCEDURE — 665999 HH PPS REVENUE DEBIT

## 2021-01-15 DIAGNOSIS — Z23 NEED FOR VACCINATION: ICD-10-CM
